# Patient Record
Sex: MALE | Race: WHITE | NOT HISPANIC OR LATINO | Employment: FULL TIME | URBAN - METROPOLITAN AREA
[De-identification: names, ages, dates, MRNs, and addresses within clinical notes are randomized per-mention and may not be internally consistent; named-entity substitution may affect disease eponyms.]

---

## 2019-06-11 ENCOUNTER — OFFICE VISIT (OUTPATIENT)
Dept: PULMONOLOGY | Facility: CLINIC | Age: 39
End: 2019-06-11

## 2019-06-11 VITALS
BODY MASS INDEX: 34.83 KG/M2 | OXYGEN SATURATION: 95 % | HEIGHT: 74 IN | TEMPERATURE: 97.3 F | DIASTOLIC BLOOD PRESSURE: 82 MMHG | SYSTOLIC BLOOD PRESSURE: 140 MMHG | HEART RATE: 85 BPM | WEIGHT: 271.4 LBS

## 2019-06-11 DIAGNOSIS — R06.02 SHORTNESS OF BREATH: Primary | ICD-10-CM

## 2019-06-11 DIAGNOSIS — R53.82 CHRONIC FATIGUE: ICD-10-CM

## 2019-06-11 DIAGNOSIS — R06.83 SNORING: ICD-10-CM

## 2019-06-11 PROCEDURE — 94375 RESPIRATORY FLOW VOLUME LOOP: CPT | Performed by: INTERNAL MEDICINE

## 2019-06-11 PROCEDURE — 94729 DIFFUSING CAPACITY: CPT | Performed by: INTERNAL MEDICINE

## 2019-06-11 PROCEDURE — 99203 OFFICE O/P NEW LOW 30 MIN: CPT | Performed by: INTERNAL MEDICINE

## 2019-06-11 PROCEDURE — 94726 PLETHYSMOGRAPHY LUNG VOLUMES: CPT | Performed by: INTERNAL MEDICINE

## 2019-06-11 RX ORDER — LOSARTAN POTASSIUM AND HYDROCHLOROTHIAZIDE 12.5; 5 MG/1; MG/1
1 TABLET ORAL DAILY
COMMUNITY

## 2019-06-12 PROBLEM — R06.83 SNORING: Status: ACTIVE | Noted: 2019-06-12

## 2019-06-12 PROBLEM — J45.20 MILD INTERMITTENT ASTHMA WITHOUT COMPLICATION: Status: ACTIVE | Noted: 2019-06-12

## 2019-06-12 PROBLEM — R53.82 CHRONIC FATIGUE: Status: ACTIVE | Noted: 2019-06-12

## 2019-06-12 PROBLEM — R06.02 SHORTNESS OF BREATH: Status: ACTIVE | Noted: 2019-06-12

## 2019-06-12 NOTE — PROGRESS NOTES
Initial Pulmonary Consult Note    Subjective   Reason for consultation/Chief Complaint: Shortness of Breath    Gama Carlisle is a 39 y.o. male is being seen for consultation today at the request of Darwin Aguilar    History of Present Illness    Mr. Carlisle is a 38yo M  who is referred for abnormal PFTs. He had pulmonary function studies performed as part of his yearly evaluation for work. He reports that he did have a cold when those were performed. He has shortness of breath with exertion but he attributed this to aging and gaining weight. He does not have any history of asthma but does have a significant history of smoke exposure secondary to his job. He has been tried on Singulair in the past. He felt like this made his shortness of breath worse and he developed headaches so singulair was discontinued. He was given a prescription for Proair but he never used it. He also experiences some chest tightness while running.     He does have a history of poor sleep with snoring. His wife told him that he may have apneic episodes while sleeping. He remains fatigued during the day.     Active Ambulatory Problems     Diagnosis Date Noted   • Shortness of breath 06/12/2019   • Snoring 06/12/2019   • Chronic fatigue 06/12/2019   • Mild intermittent asthma without complication 06/12/2019     Resolved Ambulatory Problems     Diagnosis Date Noted   • No Resolved Ambulatory Problems     Past Medical History:   Diagnosis Date   • Hypertension        Past Surgical History:   Procedure Laterality Date   • SKIN GRAFT  1984       History reviewed. No pertinent family history.    Social History     Socioeconomic History   • Marital status:      Spouse name: Not on file   • Number of children: Not on file   • Years of education: Not on file   • Highest education level: Not on file   Tobacco Use   • Smoking status: Never Smoker   • Smokeless tobacco: Never Used   Substance and Sexual Activity   • Alcohol use: Yes   •  "Drug use: No   • Sexual activity: Defer       No Known Allergies    Current Outpatient Medications   Medication Sig Dispense Refill   • losartan-hydrochlorothiazide (HYZAAR) 50-12.5 MG per tablet Take 1 tablet by mouth Daily.       No current facility-administered medications for this visit.        Review of Systems   Constitutional: Positive for fatigue.   HENT: Negative.    Eyes: Negative.    Respiratory: Positive for shortness of breath.    Cardiovascular: Negative.    Gastrointestinal: Positive for constipation.   Endocrine: Positive for polyphagia.   Genitourinary: Negative.    Musculoskeletal: Negative.    Skin: Negative.    Allergic/Immunologic: Negative.    Neurological: Negative.    Hematological: Negative.    Psychiatric/Behavioral: Positive for agitation. The patient is nervous/anxious.          Objective   Blood pressure 140/82, pulse 85, temperature 97.3 °F (36.3 °C), height 188 cm (74\"), weight 123 kg (271 lb 6.4 oz), SpO2 95 %.  Physical Exam   Constitutional: He is oriented to person, place, and time. He appears well-developed and well-nourished. No distress.   HENT:   Head: Normocephalic and atraumatic.   Mouth/Throat: Oropharynx is clear and moist.   Eyes: Conjunctivae are normal. Pupils are equal, round, and reactive to light. No scleral icterus.   Neck: Normal range of motion. Neck supple. No tracheal deviation present. No thyromegaly present.   Cardiovascular: Normal rate, regular rhythm and normal heart sounds.   Pulmonary/Chest: Effort normal and breath sounds normal. No respiratory distress.   Abdominal: Soft. Bowel sounds are normal. There is no tenderness.   Musculoskeletal: Normal range of motion. He exhibits no edema.   Lymphadenopathy:     He has no cervical adenopathy.   Neurological: He is alert and oriented to person, place, and time. He exhibits normal muscle tone. Coordination normal.   Skin: Skin is warm and dry. No rash noted. No erythema.   Psychiatric: He has a normal mood and " affect. His speech is normal and behavior is normal. Judgment normal.   Nursing note and vitals reviewed.      PFTs:  There is no airway obstruction by strict ATS criteria. FEV1/FVC ratio is 70% predicted with an FEV1 of 3.48L (78% predicted).  Lung volumes are normal.   DLCO is normal.     Imaging:  Performed in clinic today and reviewed. This is a PA/Lateral film. There cardiac and mediastinal contours are normal. Lung volumes are normal and lung fields are grossly clear. There is no pneumothorax or pleural effusion.     Impression: No acute cardiopulmonary process.         Assessment/Plan   Gama was seen today for shortness of breath.    Diagnoses and all orders for this visit:    Shortness of breath  -     XR Chest PA & Lateral  -     Full Pulmonary Function Test Without Bronchodilator    Snoring    Chronic fatigue        Discussion:  Mr. Carlisle is a 38yo M who is referred for abnormal PFTs.     1. Mild Asthma  - His symptoms and borderline PFTs are suggestive of asthma  - Start Breo 200 once daily.   - Albuterol as needed for chest tightness with exertion.   - He will remain on Breo and follow up in 4-6 weeks with repeat Spirometry to assess for any improvement.     2. Snoring/Fatigue  - He does not have restorative sleep with snoring and possible apneas per his wife.   - He remains tired during the day despite adequate sleep time.  - We will get a home sleep study for further evaluation for possible sleep apnea.          I personally spent over half of a total 30 minutes face to face with the patient in counseling and discussion and/or coordination of care as described above.       Uzma BANUELOS Case, DO  Pulmonary and Critical Care Medicine  Note Electronically Signed

## 2019-06-20 DIAGNOSIS — R06.83 SNORING: Primary | ICD-10-CM

## 2019-06-20 DIAGNOSIS — R53.82 CHRONIC FATIGUE: ICD-10-CM
